# Patient Record
(demographics unavailable — no encounter records)

---

## 2024-10-24 NOTE — ASSESSMENT
[FreeTextEntry1] : . Plan: - Patient has significant nasal obstruction refractory to maximal medical therapy and structural anatomic abnormalities that would benefit from nasal airway surgery. This would comprise septorhinoplasty via open approach with structural cartilage grafting (bilateral  grafts, caudal septum reconstruction) and bilateral turbinate reduction.  - Discussed in detail the benefits, alternatives, and risks of this procedure which include, but are not limited to, infection, bleeding, scarring, change in appearance, septal perforation, continued nasal obstruction, and need for revision surgery. - The patient reports understanding of these risks, the proposed benefits, and alternatives and wishes to proceed. Pt is scheduled for surgery in November.  -- Magalie Nazario MD Facial Plastic & Reconstructive Surgery.

## 2024-10-24 NOTE — PHYSICAL EXAM
[TextEntry] : GEN: well nourished, well developed, no acute distress. EXT NOSE: thick skin, midline dorsum, broad bulbous tip with poor support, overprojection w/ broad dorsal hump and slight tension nose deformity, ptotic tip with underrotation INT NOSE: Mucosa healthy with some prominent blood vessesl along membranous septum R>L, rightward caudal septum deviation with posterior leftward spur, very narrow internal nasal valve w/ significant improvement on Jason maneuver, left-sided ENV dynamic collapse, moderate turbinate hypertrophy

## 2024-10-24 NOTE — HISTORY OF PRESENT ILLNESS
[de-identified] : Mr. AC FORD is a pleasant 68 year male who presented for nasal obstruction. No changes to medical status since last office visit and continues to endorse nasal obstruction, particularly when supine. He had some questions regarding the surgery that were answered. Postoperative care was reviewed.

## 2024-11-14 NOTE — REASON FOR VISIT
[de-identified] : Pt is doing well since surgery. Pain is well-controlled at this point on just Tylenol/Advil. He has been doing the mupirocin ointment and nasal saline sprays as prescribed. Has had some tearing of the eyes.   Exam: Dorsum moderate swelling as expected  Transcolumellar incision well-healing, sutures removed Septum intact & midline, nasal airway widely patent Nasal tip well-supported   . Plan: - Mupirocin & nasal saline sprays as prescribed. - Silicone and Arnica cream recommended - May consider steroid injections at next office visit - Nasal precautions reviewed. - RTC 1 month   -- Magalie Nazario MD Facial Plastic & Reconstructive Surgery

## 2024-12-09 NOTE — REASON FOR VISIT
[de-identified] : Pt is doing well since surgery. Pain is improving though he still has some tenderness and numbness of the lip. Breathing is improved though he still has some congestion and needs to blow his nose. He is very happy with the appearance of the nose. He does note small bump on left side of the nose.  Exam: Dorsum moderate swelling as expected - improving Transcolumellar incision well-healing Septum intact & midline, nasal airway widely patent Prominence of left marginal incision noted could be related to graft vs incision scar Nasal tip and nasal valves well-supported  . Plan: - Mupirocin & nasal saline sprays as prescribed. - May consider steroid injection vs minor revision to this area of prominence - Nasal precautions reviewed. - RTC 2 months  -- Magalie Nazario MD Facial Plastic & Reconstructive Surgery.

## 2025-01-15 NOTE — REASON FOR VISIT
[de-identified] : open nasal airway surgery [de-identified] : 11/8/25 [de-identified] : Pt is doing well since surgery. He is very happy with his nasal appearance. Still some tenderness and numbness but this is subsiding. He still notes a small bump on left side of the inside of the nose.  Exam: Dorsum moderate swelling as expected - improving significantly Transcolumellar incision well-healed Septum intact & midline, nasal airway widely patent Prominence of left marginal incision noted could be related to graft vs incision scar - significantly improved compared to prior Nasal tip and nasal valves well-supported  Procedure: Intralesional Injection of Steroid  Indications: 1. Intranasal Scar Anesthesia: LMX Consent: Risks, benefits, and alternatives were discussed with the patient and informed consent was obtained. Details: The planned areas of injection with gently cleansed with alcohol swabs and marked as appropriate. A total of 0.1cc of Kenalog-40 was placed intralesionally along the scar in the left INV area. Disposition: The patient tolerated the procedure without issue. . Plan: - Mupirocin & nasal saline sprays as prescribed. - Nasal precautions reviewed. - We also had a long discussion regarding goals for facial rejuvenation surgery including expected postoperative course and risks. Goals remain the same as previously discussed including lower and upper eyelid rejuvenation as well as neck/facelift to address the jawline and skin laxity in this area. We discussed the expected postoperative recovery timeline which includes 1 week off from work with a large bandage, and at least 1 month of activity restrictions with need for dressing at night. We also discussed swelling of the eyes which will last for at least 1 week. Pt is interested in proceeding and we will schedule accordingly. - RTC 1 months  -- Magalie Nazario MD Facial Plastic & Reconstructive Surgery.

## 2025-02-12 NOTE — REASON FOR VISIT
[de-identified] : AC FORD is status post open nasal airway surgery and he is here for a post-op visit.   Surgery Date: 11/8/25   Pt is doing well since surgery. He is very happy with his nasal appearance. Reports breathing is 90% better. Still notes there is improvement when he pulls his cheeks outward. Also notes that when he feels his nostrils there is a small ridge on the left.   Exam: Dorsum moderate swelling as expected - improving significantly Transcolumellar incision well-healed Septum intact & midline, nasal airway widely patent Prominence of left marginal incision laterally is significantly improved compared to prior after steroid injection Nasal tip and nasal valves well-supported. There is cartilage edge noted at medial aspect of intercartilagenous area (possibly caudal edge of ULC) that is palpable but not causing functional deficit .  Plan: - Mupirocin & nasal saline sprays as prescribed. - Nasal precautions reviewed. - We can plan to shave off the small cartilage on the left side off. We did discuss that this likely won't have an impact on his nasal airway but will improve the noticeability of the cartilage edge when he feels the inside of his nose. Discussed potential risks of this procedure and pt would like to proceed in concurrence with his upcoming surgery. - Pt also has small asymmetry of parotid tail area with palpable 1-2cm firmness in this area. He does report he had a lymph node excision? in the area in the past. Will plan to obtain imaging for better characterization. Has been there at least 10 years per report.  -- Magalie Nazario MD Facial Plastic & Reconstructive Surgery.

## 2025-04-28 NOTE — HISTORY OF PRESENT ILLNESS
[de-identified] : AC FORD is status post open nasal airway surgery and he is here for a post-op visit. Surgery Date: 11/8/25  Pt is doing well since surgery. He is very happy with his nasal appearance. Reports breathing is improved.   Exam: Dorsum mild swelling as expected - improving significantly Transcolumellar incision well-healed Septum intact & midline, nasal airway widely patent Prominence of left marginal incision appears resolved on visualization. Nasal valves well-supported  . Plan: - Mupirocin & nasal saline sprays as prescribed. - Nasal precautions reviewed. - Would not recommend any further intervention for the nose.  - Prior imaging results also reviewed without evidence of abnormalities on right parotid tail area.   -- Magalie Nazario MD Facial Plastic [facility] Reconstructive Surgery.

## 2025-05-11 NOTE — REASON FOR VISIT
[de-identified] : facelift, bilateral upper and lower bleph [de-identified] : 5/9/25 [de-identified] : Pt is doing well since surgery. Pain is overall well-controlled. No concerns. He had to loosen the bandage somewhat due to tightness overnight.   Exam: No acute distress Periauricular incisions well-healing with healthy postuauricular skin Scant amount of blood was milked out from left side but otherwise no evidence of hematoma or blood collection Drain with sanguinous output, 20cc - removed Chin incision intact and clean Upper bleph incisions clean and covered in Steristrips - trimmed Lower lid soft with expected swelling Facial movements intact and symmetric bilaterally, sensation intact  Plan: - Surgical dressing removed - Wound care discussed - jawbra 24h until next week, ointment to all incisions - Activity restrictions reviewed - Continue pain meds and abx - RTC next week for suture removal  -- Magalie Nazario MD Fac[facility] Plastic [facility] Reconstructive Surgery

## 2025-05-14 NOTE — REASON FOR VISIT
[de-identified] : facelift, bilateral upper and lower bleph [de-identified] : 5/9/25 [de-identified] : Pt is doing well since surgery. Pain is overall well-controlled. No concerns. He had to loosen the bandage somewhat due to tightness and has switched to a larger one at this point.   Exam: No acute distress Periauricular incisions well-healing with healthy postuauricular skin - slight erythema of the left most superior aspect of postauricular flap. Sutures removed Scant amount of blood was milked out from left side approx 5cc. Right side appears flat and well-seated Chin incision intact and clean. Sutures removed Upper bleph incisions clean and covered in Steristrips. Sutures removed  Lower lid soft with expected swelling Facial movements intact and symmetric bilaterally, sensation intact  Plan: - Surgical dressing removed - Wound care discussed - jawbra 24h until next week, ointment to all incisions, arnica for bruising - Activity restrictions reviewed - Nitropaste TID for left postauricular area - RTC 1 week   -- Magalie Nazario MD [facility] Re[facility]ive Surgery

## 2025-05-19 NOTE — REASON FOR VISIT
[de-identified] : AC FORD is status post facelift, bilateral upper and lower bleph and he is here for a post-op visit.   Surgery Date: 5/9/25   Pt is doing well since surgery. Pain is well-controlled. He did notice the left postauricular area had become somewhat full over the weekend and presents for reevaulation.   Exam: No acute distress Periauricular incisions well-healing with healthy postuauricular skin bilaterally  Approx 5cc of serosanguinous fluid was aspirated with 18g needle from left postauriuclar incision. Right side appears flat and well-seated Chin incision intact and clean Upper bleph incisions well-healing. Bilateral periorbital edema resolving significantly Facial movements intact and symmetric bilaterally, sensation intact  Plan: - Aspiration of serosang fluid from left postauricular incision and pressure wrap placed - Pt will remove wrap tomorrow afternoon and let us know how it looks. If remains flat, can go to wearing jawbra just at night - Can stop Nitrobid paste at this point - Vaseline till the weekend, then switch to scar cream - Activity restrictions reviewed - RTC pending left postauricular fluid collection  -- Magalie Nazario MD Fac[facility] Plastic [facility] Reconstructive Surgery.

## 2025-05-21 NOTE — REASON FOR VISIT
[de-identified] : AC FORD is status post neck/lower facelift, bilateral upper and lower bleph and he is here for a post-op visit.   Surgery Date: 5/9/25   Pt is doing well since surgery. Pain is well-controlled and he is off oxycodone at this point. He presents for follow up of left postauricular area.   Exam: No acute distress Periauricular incisions well-healing with healthy postuauricular skin bilaterally  <1cc serosanguinous fluid was aspirated with 18g needle from left postauriuclar incision. Right side appears flat and well-seated Chin incision intact and clean Upper bleph incisions well-healing. Bilateral periorbital edema improving Facial movements intact and symmetric bilaterally, sensation intact  Plan: - Improvement/resolution of hematoma/seroma of left postauricular skin flap. Reiterated importance of wearing the elastic bandage when home and advised to tuck some gauze behind left ear in order to reinforce pressure in this area  - Ok to switch to silicone scar cream across all incisions - Activity restrictions reviewed - RTC 1 month  -- Magalie Nazario MD Fac[facility] Plastic [facility] Reconstructive Surgery.